# Patient Record
Sex: FEMALE | Race: WHITE | URBAN - METROPOLITAN AREA
[De-identification: names, ages, dates, MRNs, and addresses within clinical notes are randomized per-mention and may not be internally consistent; named-entity substitution may affect disease eponyms.]

---

## 2020-03-16 ENCOUNTER — TELEPHONE (OUTPATIENT)
Dept: FAMILY MEDICINE | Facility: CLINIC | Age: 65
End: 2020-03-16

## 2020-03-16 NOTE — TELEPHONE ENCOUNTER
Called and left patient VM to return call to pink team regarding message below  Pilar Valdez CMA on 3/16/2020 at 10:02 AM

## 2020-03-16 NOTE — TELEPHONE ENCOUNTER
Please call patient. Did she request prednisone to be refilled? What for? Patient has not been seen at this clinic before. Would either need OV or telephone visit with a provider.     Jolene Oneal RN

## 2020-03-19 NOTE — TELEPHONE ENCOUNTER
2nd attempt. Called and left patient VM to return call to pink team regarding message below  Pilar Valdez CMA on 3/19/2020 at 7:57 AM

## 2025-01-19 NOTE — TELEPHONE ENCOUNTER
Received fax from pharmacy stating that patient needs refill for Prednisone 20mg oral tab but patient has nothing listed on her medication list.    none